# Patient Record
Sex: FEMALE | URBAN - METROPOLITAN AREA
[De-identification: names, ages, dates, MRNs, and addresses within clinical notes are randomized per-mention and may not be internally consistent; named-entity substitution may affect disease eponyms.]

---

## 2022-06-20 ENCOUNTER — OFFICE VISIT (OUTPATIENT)
Dept: URBAN - METROPOLITAN AREA CLINIC 115 | Facility: CLINIC | Age: 67
End: 2022-06-20

## 2022-06-27 ENCOUNTER — OFFICE VISIT (OUTPATIENT)
Dept: URBAN - METROPOLITAN AREA CLINIC 115 | Facility: CLINIC | Age: 67
End: 2022-06-27

## 2022-08-08 ENCOUNTER — WEB ENCOUNTER (OUTPATIENT)
Dept: URBAN - METROPOLITAN AREA CLINIC 115 | Facility: CLINIC | Age: 67
End: 2022-08-08

## 2022-08-09 ENCOUNTER — OFFICE VISIT (OUTPATIENT)
Dept: URBAN - METROPOLITAN AREA CLINIC 115 | Facility: CLINIC | Age: 67
End: 2022-08-09
Payer: COMMERCIAL

## 2022-08-09 ENCOUNTER — LAB OUTSIDE AN ENCOUNTER (OUTPATIENT)
Dept: URBAN - METROPOLITAN AREA CLINIC 115 | Facility: CLINIC | Age: 67
End: 2022-08-09

## 2022-08-09 ENCOUNTER — DASHBOARD ENCOUNTERS (OUTPATIENT)
Age: 67
End: 2022-08-09

## 2022-08-09 VITALS
WEIGHT: 278.2 LBS | RESPIRATION RATE: 17 BRPM | BODY MASS INDEX: 51.19 KG/M2 | TEMPERATURE: 98.3 F | HEART RATE: 81 BPM | HEIGHT: 62 IN | DIASTOLIC BLOOD PRESSURE: 77 MMHG | SYSTOLIC BLOOD PRESSURE: 143 MMHG

## 2022-08-09 DIAGNOSIS — D50.9 IRON DEFICIENCY ANEMIA, UNSPECIFIED IRON DEFICIENCY ANEMIA TYPE: ICD-10-CM

## 2022-08-09 DIAGNOSIS — K21.9 GASTROESOPHAGEAL REFLUX DISEASE, UNSPECIFIED WHETHER ESOPHAGITIS PRESENT: ICD-10-CM

## 2022-08-09 DIAGNOSIS — E66.01 MORBID OBESITY: ICD-10-CM

## 2022-08-09 DIAGNOSIS — R13.14 PHARYNGOESOPHAGEAL DYSPHAGIA: ICD-10-CM

## 2022-08-09 PROBLEM — 9014002: Status: ACTIVE | Noted: 2022-08-09

## 2022-08-09 PROBLEM — 87522002: Status: ACTIVE | Noted: 2022-08-09

## 2022-08-09 PROBLEM — 235595009: Status: ACTIVE | Noted: 2022-08-09

## 2022-08-09 PROBLEM — 40739000: Status: ACTIVE | Noted: 2022-08-09

## 2022-08-09 PROBLEM — 238136002: Status: ACTIVE | Noted: 2022-08-09

## 2022-08-09 PROCEDURE — 99203 OFFICE O/P NEW LOW 30 MIN: CPT | Performed by: INTERNAL MEDICINE

## 2022-08-09 RX ORDER — ESOMEPRAZOLE MAGNESIUM 20 MG/1
1 PACKET MIXED WITH 15 ML OF WATER GRANULE, DELAYED RELEASE ORAL ONCE A DAY
Status: ACTIVE | COMMUNITY

## 2022-08-09 RX ORDER — DICLOFENAC POTASSIUM 50 MG/1
1 TABLET WITH FOOD OR MILK AS NEEDED TABLET, FILM COATED ORAL TWICE A DAY
Status: ACTIVE | COMMUNITY

## 2022-08-09 NOTE — HPI-TODAY'S VISIT:
66y/o Black female  1  with mhx of Psoriasis, morbid obesity that was refer here by PCP given hx of iron def anemia,refer prior colonoscopy 10 years ago with no abnormalities.no family hx of CRC.No black stools or rectal bleeding. Use nexium for GERD but recently she develops dysphagia.No smoke.

## 2022-09-28 ENCOUNTER — TELEPHONE ENCOUNTER (OUTPATIENT)
Dept: URBAN - METROPOLITAN AREA CLINIC 23 | Facility: CLINIC | Age: 67
End: 2022-09-28